# Patient Record
Sex: FEMALE | Race: AMERICAN INDIAN OR ALASKA NATIVE
[De-identification: names, ages, dates, MRNs, and addresses within clinical notes are randomized per-mention and may not be internally consistent; named-entity substitution may affect disease eponyms.]

---

## 2019-04-14 ENCOUNTER — HOSPITAL ENCOUNTER (EMERGENCY)
Dept: HOSPITAL 5 - ED | Age: 79
Discharge: HOME | End: 2019-04-14
Payer: MEDICARE

## 2019-04-14 VITALS — DIASTOLIC BLOOD PRESSURE: 48 MMHG | SYSTOLIC BLOOD PRESSURE: 111 MMHG

## 2019-04-14 DIAGNOSIS — K59.00: Primary | ICD-10-CM

## 2019-04-14 DIAGNOSIS — Z88.6: ICD-10-CM

## 2019-04-14 DIAGNOSIS — Z88.8: ICD-10-CM

## 2019-04-14 DIAGNOSIS — Z98.51: ICD-10-CM

## 2019-04-14 DIAGNOSIS — G89.29: ICD-10-CM

## 2019-04-14 DIAGNOSIS — E86.0: ICD-10-CM

## 2019-04-14 DIAGNOSIS — J44.9: ICD-10-CM

## 2019-04-14 DIAGNOSIS — E11.9: ICD-10-CM

## 2019-04-14 DIAGNOSIS — I10: ICD-10-CM

## 2019-04-14 DIAGNOSIS — N30.00: ICD-10-CM

## 2019-04-14 LAB
ALBUMIN SERPL-MCNC: 4.3 G/DL (ref 3.9–5)
ALT SERPL-CCNC: 18 UNITS/L (ref 7–56)
APTT BLD: 33.2 SEC. (ref 24.2–36.6)
BASOPHILS # (AUTO): 0.2 K/MM3 (ref 0–0.1)
BASOPHILS NFR BLD AUTO: 1.4 % (ref 0–1.8)
BILIRUB UR QL STRIP: (no result)
BLOOD UR QL VISUAL: (no result)
BUN SERPL-MCNC: 12 MG/DL (ref 7–17)
BUN/CREAT SERPL: 12 %
CALCIUM SERPL-MCNC: 9.4 MG/DL (ref 8.4–10.2)
EOSINOPHIL # BLD AUTO: 0.1 K/MM3 (ref 0–0.4)
EOSINOPHIL NFR BLD AUTO: 0.4 % (ref 0–4.3)
HCT VFR BLD CALC: 38.2 % (ref 30.3–42.9)
HEMOLYSIS INDEX: 27
HGB BLD-MCNC: 12.7 GM/DL (ref 10.1–14.3)
INR PPP: 1.03 (ref 0.87–1.13)
LYMPHOCYTES # BLD AUTO: 2.3 K/MM3 (ref 1.2–5.4)
LYMPHOCYTES NFR BLD AUTO: 15.1 % (ref 13.4–35)
MCHC RBC AUTO-ENTMCNC: 33 % (ref 30–34)
MCV RBC AUTO: 89 FL (ref 79–97)
MONOCYTES # (AUTO): 1.3 K/MM3 (ref 0–0.8)
MONOCYTES % (AUTO): 8.7 % (ref 0–7.3)
PH UR STRIP: 8 [PH] (ref 5–7)
PLATELET # BLD: 212 K/MM3 (ref 140–440)
PROT UR STRIP-MCNC: (no result) MG/DL
RBC # BLD AUTO: 4.3 M/MM3 (ref 3.65–5.03)
RBC #/AREA URNS HPF: 42 /HPF (ref 0–6)
UROBILINOGEN UR-MCNC: < 2 MG/DL (ref ?–2)
WBC #/AREA URNS HPF: 15 /HPF (ref 0–6)

## 2019-04-14 PROCEDURE — 96361 HYDRATE IV INFUSION ADD-ON: CPT

## 2019-04-14 PROCEDURE — 96375 TX/PRO/DX INJ NEW DRUG ADDON: CPT

## 2019-04-14 PROCEDURE — 93010 ELECTROCARDIOGRAM REPORT: CPT

## 2019-04-14 PROCEDURE — 80053 COMPREHEN METABOLIC PANEL: CPT

## 2019-04-14 PROCEDURE — 85610 PROTHROMBIN TIME: CPT

## 2019-04-14 PROCEDURE — 81001 URINALYSIS AUTO W/SCOPE: CPT

## 2019-04-14 PROCEDURE — 84484 ASSAY OF TROPONIN QUANT: CPT

## 2019-04-14 PROCEDURE — 36415 COLL VENOUS BLD VENIPUNCTURE: CPT

## 2019-04-14 PROCEDURE — 85025 COMPLETE CBC W/AUTO DIFF WBC: CPT

## 2019-04-14 PROCEDURE — 74176 CT ABD & PELVIS W/O CONTRAST: CPT

## 2019-04-14 PROCEDURE — 83690 ASSAY OF LIPASE: CPT

## 2019-04-14 PROCEDURE — 93005 ELECTROCARDIOGRAM TRACING: CPT

## 2019-04-14 PROCEDURE — 96365 THER/PROPH/DIAG IV INF INIT: CPT

## 2019-04-14 PROCEDURE — 96372 THER/PROPH/DIAG INJ SC/IM: CPT

## 2019-04-14 PROCEDURE — 85730 THROMBOPLASTIN TIME PARTIAL: CPT

## 2019-04-14 PROCEDURE — 99284 EMERGENCY DEPT VISIT MOD MDM: CPT

## 2019-04-14 PROCEDURE — 71046 X-RAY EXAM CHEST 2 VIEWS: CPT

## 2019-04-14 NOTE — XRAY REPORT
PROCEDURE:  XR CHEST ROUTINE 2V 

 

TECHNIQUE:  PA and lateral chest radiographs were obtained.  

 

HISTORY:  epigastric chest pain  

 

COMPARISONS:  None. 

 

FINDINGS: 

 

Heart:  Normal. 

Mediastinum/Vessels: Normal. 

Lungs/Pleural space:  Mild COPD. No consolidation, effusion or pneumothorax. 

Bony thorax: No acute osseous abnormality. 

 

 

IMPRESSION:  Mild COPD. There is no evidence of an acute infiltrate or effusion. 

 

This document is electronically signed by Lela Kraus DO., April 14 2019 03:16:10 AM ET

## 2019-04-14 NOTE — CAT SCAN REPORT
PROCEDURE: CT ABDOMEN PELVIS WO CON  

 

preliminary report: 

Lingular linear scar versus atelectasis 

The lung bases appear hyperinflated 

The gallbladder is distended but is otherwise unremarkable 

No focal pancreatic lesion is seen  

no renal or ureteral calculus 

No bowel obstruction 

Normal appendix 

No evidence of diverticulitis 

No adnexal mass 

The urinary bladder is unremarkable 

Considerable rectal wall thickening and perirectal stranding, consistent with proctitis. No perirecta
l abscess is seen 

 

This document is electronically signed by Fantasma Martinez MD., April 14 2019 04:33:56 AM ET

## 2019-04-14 NOTE — EMERGENCY DEPARTMENT REPORT
<EPHRAIM CAI - Last Filed: 19 06:57>





ED Abdominal Pain HPI





- General


Chief Complaint: Abdominal Pain


Stated Complaint: CONSTIPATION


Time Seen by Provider: 19 02:26


Source: patient


Mode of arrival: Ambulatory


Limitations: No Limitations





- History of Present Illness


Initial Comments: 


 Patient is 78-year-old  female with a history of IBS 

hypertension who presents for constipation last bowel movement 3 days ago pain 

radiates from her abdominal epigastric patient is on omeprazole otherwise that 

she is not adherent with same medications for diabetes and arthritis  patient 

followed by gastroenterology Dr. Makayla ANDERSON same in 2 days as Monday there is no 

nausea vomiting no fever chills there is decreased appetite patient does note 

decreased urinary output R there is no chest pain or shortness of breath.  

Patient states one large bowel movement prior to arriving to ED tonight





MD Complaint: abdominal pain


Onset/Timin


-: days(s)


Location: diffuse


Radiation: LLQ, RLQ


Migration to: epigastric


Severity: moderate


Severity scale (0 -10): 1


Quality: aching


Consistency: constant


Improves With: bowel movement


Worsens With: movement


Context: other (constipation )


Associated Symptoms: nausea, constipation





- Related Data


                                  Previous Rx's











 Medication  Instructions  Recorded  Last Taken  Type


 


Ciprofloxacin HCl [Cipro] 500 mg PO BID 10 Days #20 tablet 19 Unknown Rx


 


Polyethylene Glycol 3350 [Miralax 17 gm PO BID PRN #14 packet 19 Unknown 

Rx





3350]    











                                    Allergies











Allergy/AdvReac Type Severity Reaction Status Date / Time


 


ibuprofen [From Motrin] Allergy  Diarrhea Verified 19 02:34


 


iodine AdvReac  Anaphylaxis Verified 19 02:35














ED Review of Systems


Constitutional: denies: chills, fever


Eyes: denies: eye pain, eye discharge, vision change


ENT: denies: ear pain, throat pain


Respiratory: denies: cough, shortness of breath, wheezing


Cardiovascular: denies: chest pain, palpitations


Endocrine: no symptoms reported


Gastrointestinal: abdominal pain, nausea, constipation.  denies: vomiting, 

diarrhea, hematemesis, melena, hematochezia


Genitourinary: denies: urgency, dysuria, frequency, hematuria, discharge, 

abnormal menses, dyspareunia


Musculoskeletal: denies: back pain, joint swelling, arthralgia


Skin: denies: rash, lesions


Neurological: denies: headache, weakness, paresthesias


Psychiatric: denies: anxiety, depression


Hematological/Lymphatic: denies: easy bleeding, easy bruising





ED Past Medical Hx





- Past Medical History


Hx Hypertension: Yes


Hx Diabetes: Yes


Hx Asthma: Yes


Hx COPD: Yes


Additional medical history: Chronic Pain, IBS, Chronic Constipation





- Surgical History


Past Surgical History?: Yes


Additional Surgical History: Left rotator cuff. Tubal Ligation





- Social History


Smoking Status: Never Smoker


Substance Use Type: None





- Medications


Home Medications: 


                                Home Medications











 Medication  Instructions  Recorded  Confirmed  Last Taken  Type


 


Ciprofloxacin HCl [Cipro] 500 mg PO BID 10 Days #20 tablet 19  Unknown Rx


 


Polyethylene Glycol 3350 [Miralax 17 gm PO BID PRN #14 packet 19  Unknown 

Rx





3350]     














ED Physical Exam





- General


Limitations: No Limitations


General appearance: alert, in no apparent distress





- Head


Head exam: Present: atraumatic, normocephalic





- Eye


Eye exam: Present: normal appearance, PERRL, EOMI


Pupils: Present: normal accommodation





- ENT


ENT exam: Present: normal exam, mucous membranes moist





- Neck


Neck exam: Present: normal inspection, full ROM.  Absent: tenderness, 

meningismus, lymphadenopathy, thyromegaly





- Respiratory


Respiratory exam: Present: normal lung sounds bilaterally.  Absent: respiratory 

distress, wheezes, stridor, chest wall tenderness





- Cardiovascular


Cardiovascular Exam: Present: normal rhythm, tachycardia, normal heart sounds.  

Absent: systolic murmur, diastolic murmur, rubs, gallop





- GI/Abdominal


GI/Abdominal exam: Present: soft, distended (mild distention ), tenderness (LLQ 

LUQ ), normal bowel sounds.  Absent: guarding, rebound, rigid, bruit, hernia





- Extremities Exam


Extremities exam: Present: normal inspection





- Back Exam


Back exam: Present: normal inspection, full ROM.  Absent: tenderness, CVA tender

ness (R), CVA tenderness (L), muscle spasm, rash noted





- Neurological Exam


Neurological exam: Present: alert, oriented X3, CN II-XII intact, normal gait





- Psychiatric


Psychiatric exam: Present: normal affect





- Skin


Skin exam: Present: warm, dry, intact, normal color.  Absent: rash





ED Medical Decision Making





- Lab Data


Result diagrams: 


                                 19 03:14





                                 19 03:14








Labs











  19/19





  03:14 03:14 03:14


 


WBC  15.2 H  


 


RBC  4.30  


 


Hgb  12.7  


 


Hct  38.2  


 


MCV  89  


 


MCH  30  


 


MCHC  33  


 


RDW  13.4  


 


Plt Count  212  


 


Lymph % (Auto)  15.1  


 


Mono % (Auto)  8.7 H  


 


Eos % (Auto)  0.4  


 


Baso % (Auto)  1.4  


 


Lymph #  2.3  


 


Mono #  1.3 H  


 


Eos #  0.1  


 


Baso #  0.2 H  


 


Seg Neutrophils %  74.4 H  


 


Seg Neutrophils #  11.3 H  


 


PT    14.1


 


INR    1.03


 


APTT    33.2


 


Sodium   133 L 


 


Potassium   4.6 


 


Chloride   96.9 L 


 


Carbon Dioxide   26 


 


Anion Gap   15 


 


BUN   12 


 


Creatinine   1.0 


 


Estimated GFR   > 60 


 


BUN/Creatinine Ratio   12 


 


Glucose   150 H 


 


Calcium   9.4 


 


Total Bilirubin   0.50 


 


AST   23 


 


ALT   18 


 


Alkaline Phosphatase   102 


 


Troponin T   


 


Total Protein   7.5 


 


Albumin   4.3 


 


Albumin/Globulin Ratio   1.3 


 


Lipase   8 L 


 


Urine Color   


 


Urine Turbidity   


 


Urine pH   


 


Ur Specific Gravity   


 


Urine Protein   


 


Urine Glucose (UA)   


 


Urine Ketones   


 


Urine Blood   


 


Urine Nitrite   


 


Urine Bilirubin   


 


Urine Urobilinogen   


 


Ur Leukocyte Esterase   


 


Urine WBC (Auto)   


 


Urine RBC (Auto)   


 


U Epithel Cells (Auto)   














  19





  03:14 05:55


 


WBC  


 


RBC  


 


Hgb  


 


Hct  


 


MCV  


 


MCH  


 


MCHC  


 


RDW  


 


Plt Count  


 


Lymph % (Auto)  


 


Mono % (Auto)  


 


Eos % (Auto)  


 


Baso % (Auto)  


 


Lymph #  


 


Mono #  


 


Eos #  


 


Baso #  


 


Seg Neutrophils %  


 


Seg Neutrophils #  


 


PT  


 


INR  


 


APTT  


 


Sodium  


 


Potassium  


 


Chloride  


 


Carbon Dioxide  


 


Anion Gap  


 


BUN  


 


Creatinine  


 


Estimated GFR  


 


BUN/Creatinine Ratio  


 


Glucose  


 


Calcium  


 


Total Bilirubin  


 


AST  


 


ALT  


 


Alkaline Phosphatase  


 


Troponin T  < 0.010 


 


Total Protein  


 


Albumin  


 


Albumin/Globulin Ratio  


 


Lipase  


 


Urine Color   Yellow


 


Urine Turbidity   Clear


 


Urine pH   8.0 H


 


Ur Specific Gravity   1.008


 


Urine Protein   <15 mg/dl


 


Urine Glucose (UA)   Neg


 


Urine Ketones   Tr


 


Urine Blood   Mod


 


Urine Nitrite   Neg


 


Urine Bilirubin   Neg


 


Urine Urobilinogen   < 2.0


 


Ur Leukocyte Esterase   Mod


 


Urine WBC (Auto)   15.0 H


 


Urine RBC (Auto)   42.0


 


U Epithel Cells (Auto)   < 1.0














- EKG Data


EKG shows normal: sinus rhythm, axis, intervals, QRS complexes, ST-T waves


Rate: tachycardia





- EKG Data


Interpretation: no acute changes, normal EKG (ekg interp by ed attending )





- Radiology Data


Radiology results: report reviewed, image reviewed


[facility] 


[address] [place] [address] 





Cat Scan Report 


Signed 





Patient: VIRGINIE MIMS MR#: 


[id number] 


: 1940 Acct:[id number] 





Age/Sex: 78 / F ADM Date: 19 





Loc: ED 


Attending Dr: 








Ordering Physician: EPHRAIM CAI NP 


Date of Service: 19 


Procedure(s): CT abdomen pelvis wo con 


Accession Number(s): [id number] 





cc: EPHRAIM CAI NP 








PROCEDURE: CT ABDOMEN PELVIS WO CON 





preliminary report: 


Lingular linear scar versus atelectasis 


The lung bases appear hyperinflated 


The gallbladder is distended but is otherwise unremarkable 


No focal pancreatic lesion is seen 


no renal or ureteral calculus 


No bowel obstruction 


Normal appendix 


No evidence of diverticulitis 


No adnexal mass 


The urinary bladder is unremarkable 


Considerable rectal wall thickening and perirectal stranding, consistent with 

proctitis. No 


perirectal abscess is seen 





This document is electronically signed by Fantasma Martinez MD., 2019 

04:33:56 AM ET 





Transcribed By: LUAN 


Dictated By: FANTASMA MARTINEZ MD 


Electronically Authenticated By: FANTASMA MARTINEZ MD 


Signed Date/Time: 19 0811 

















- Medical Decision Making


ekg: NSR, no ST elevated MI,  cxr: mild copd no infiltrates no opacities, ct: no

obstruction, no bleed no diverticulitis, ua" mod luek, wbc, tx for uti, miralax 

as ordered follow up with GI as scheduled in 2 days pt and daughter verbalized 

agreement and understanding of same, pt for dc to home in stable condition at 

this time. pt is now a/o x 3 ambulatory with steady gait, abd pain is now 1/10 ,

pt is tolerating po intake and hydration without symptoms. Tacycardia is 

rosolved there is no cp no vomiting no sob. 








ED Disposition


Clinical Impression: 


 Dehydration





Constipation


Qualifiers:


 Constipation type: unspecified constipation type Qualified Code(s): K59.00 - 

Constipation, unspecified





UTI (urinary tract infection)


Qualifiers:


 Urinary tract infection type: acute cystitis Hematuria presence: without 

hematuria Qualified Code(s): N30.00 - Acute cystitis without hematuria





Disposition: DC-01 TO HOME OR SELFCARE


Is pt being admited?: No


Does the pt Need Aspirin: No


Condition: Stable


Instructions:  Constipation (ED), Urinary Tract Infection in Women (ED), High 

Fiber Diet (ED), Abdominal Pain (ED)


Additional Instructions: 


Follow up with Dr. [facility] in 2 days as scheduled 


Prescriptions: 


Ciprofloxacin HCl [Cipro] 500 mg PO BID 10 Days #20 tablet


Polyethylene Glycol 3350 [Miralax 3350] 17 gm PO BID PRN #14 packet


 PRN Reason: Constipation


Referrals: 


[facility] [Provider Group] - 3-5 Days


Forms:  Work/School Release Form(ED)





<JESSICAROBY - Last Filed: 04/15/19 11:56>





ED Review of Systems


ROS: 


Stated complaint: CONSTIPATION


Other details as noted in HPI








ED Course


                                   Vital Signs











  19





  00:07 00:22 07:28


 


Temperature 99.0 F 99 F 


 


Pulse Rate 124 H 119 H 85


 


Respiratory 18 18 





Rate   


 


Blood Pressure 127/62 127/62 111/48


 


O2 Sat by Pulse 97 97 94





Oximetry   














- Reevaluation(s)


Reevaluation #1: 





04/15/19 11:55


I am signing this chart for administrative purposes.  I was available for consul

tation while this patient was in the care of my nurse practitioner colleague.  

The specific management decisions and this case were not specifically discussed 

with myself.





ED Medical Decision Making





- Lab Data


Result diagrams: 


                                 19 03:14





                                 19 03:14


Critical care attestation.: 


If time is entered above; I have spent that time in minutes in the direct care 

of this critically ill patient, excluding procedure time.








ED Disposition


Is pt being admited?: No


Does the pt Need Aspirin: No

## 2019-07-22 ENCOUNTER — HOSPITAL ENCOUNTER (OUTPATIENT)
Dept: HOSPITAL 5 - XRAY | Age: 79
Discharge: HOME | End: 2019-07-22
Attending: PAIN MEDICINE
Payer: MEDICARE

## 2019-07-22 DIAGNOSIS — I10: ICD-10-CM

## 2019-07-22 DIAGNOSIS — M47.816: ICD-10-CM

## 2019-07-22 DIAGNOSIS — M16.12: Primary | ICD-10-CM

## 2019-07-22 DIAGNOSIS — J44.9: ICD-10-CM

## 2019-07-22 PROCEDURE — 72148 MRI LUMBAR SPINE W/O DYE: CPT

## 2019-07-22 NOTE — MAGNETIC RESONANCE REPORT
MR lumbar spine wo con



INDICATION / CLINICAL INFORMATION:

79 years Female; LOW BACK PAIN. 79-year-old female with low back and left leg pain





TECHNIQUE:

Multisequence, multiplanar images of the lumbar spine were obtained.



COMPARISON: 

None available.



FINDINGS:



ALIGNMENT: Normal lumbar lordosis without significant scoliosis.

VERTEBRAE:Normal marrow signal and vertebral body height for age.

VISUALIZED SPINAL CORD: No significant abnormality. Conus is grossly normal in appearance.



INTERVERTEBRAL DISCS: Multilevel desiccation noted.

LEVEL-BY-LEVEL ANALYSIS: 



L1-2: No significant abnormality.



L2-3: No significant abnormality. Mild facet hypertrophy.



L3-4: No significant abnormality. Mild facet hypertrophy.



L4-5: Mild disc bulge and facet hypertrophy.



L5-S1: Minimal disc bulge and mild facet hypertrophy.



PARASPINAL SOFT TISSUES: No significant abnormality.



ADDITIONAL FINDINGS: None.



IMPRESSION:

1. Mild degenerative changes of the lumbar spine as described above. No single, dominant cause for pa
loydant's symptomatology seen.



Signer Name: Sudhakar Agudelo MD, III 

Signed: 7/22/2019 5:30 PM

 Workstation Name: [id number]

## 2020-02-24 ENCOUNTER — HOSPITAL ENCOUNTER (OUTPATIENT)
Dept: HOSPITAL 5 - CT | Age: 80
Discharge: HOME | End: 2020-02-24
Attending: SPECIALIST
Payer: MEDICARE

## 2020-02-24 DIAGNOSIS — E04.1: Primary | ICD-10-CM

## 2020-02-24 DIAGNOSIS — R05: ICD-10-CM

## 2020-02-24 DIAGNOSIS — R06.02: ICD-10-CM

## 2020-02-24 PROCEDURE — 71250 CT THORAX DX C-: CPT

## 2020-02-24 NOTE — CAT SCAN REPORT
CT CHEST WITHOUT CONTRAST



INDICATION:

PERSISTENT COUGH,SHORTNESS OF BREATH.



TECHNIQUE:

Axial CT images were obtained through the chest without contrast. Lack of IV contrast limits evaluati
on of the vascular and solid organs. Coronal and sagittal reformats were produced. All CT scans at [facility] are performed using CT dose reduction for ALARA by means of automated exposure control. 



COMPARISON:

2 views of the chest from 4/14/2019.



FINDINGS:



MEDIASTINUM: The thyroid gland is heterogeneous with a 1.3 cm nodule seen along the mid pole of the r
ight lobe. No mass, lymphadenopathy or other significant abnormality is seen.

HEART: No significant abnormality.

THORACIC AORTA AND ARTERIES: No significant abnormality.

LUNGS: The lungs are clear without a pleural effusion or pneumothorax. 



ADDITIONAL FINDINGS: None.



UPPER ABDOMEN: No significant abnormality.



SKELETAL SYSTEM: No significant abnormality.



IMPRESSION:

1. Nonspecific heterogeneity of the thyroid gland with a 1.3 cm right thyroid nodule. For a patient o
f this age, no further imaging is indicated at this time unless clinically warranted.

2. No other significant abnormality of the chest.





Nonpalpable nodules detected on US or other anatomic imaging studies are termed incidentally discover
ed nodules or incidentalomas. Nonpalpable nodules have the same risk of malignancy as palpable nodule
s with the same size. Generally, only nodules >1 cm should be evaluated, since they have a greater po
tential to be clinically significant cancers. (CELENA, 2009).



Follow up for incidental thyroid nodules <1 cm is not recommended.



In patients <35 years with an incidental thyroid nodule detected on CT, MRI, or extrathyroidal ultras
ound, dedicated thyroid ultrasound is recommended if the nodule is 1 cm, has no suspicious imaging fe
atures, and if the patient has normal life expectancy.



In patients 35 years with an incidental thyroid nodule detected on CT, MRI, or extrathyroidal ultraso
und, dedicated thyroid ultrasound is recommended if the nodule is 1.5 cm, has no suspicious imaging f
eatures, and if the patient has normal life expectancy.



Signer Name: Guerrero Mcleod MD 

Signed: 2/24/2020 12:47 PM

 Workstation Name: [id number]

## 2020-03-02 ENCOUNTER — HOSPITAL ENCOUNTER (OUTPATIENT)
Dept: HOSPITAL 5 - MAMMO | Age: 80
Discharge: HOME | End: 2020-03-02
Attending: INTERNAL MEDICINE
Payer: MEDICARE

## 2020-03-02 DIAGNOSIS — Z12.31: Primary | ICD-10-CM

## 2020-03-02 PROCEDURE — 77067 SCR MAMMO BI INCL CAD: CPT

## 2020-03-03 NOTE — MAMMOGRAPHY REPORT
DIGITAL SCREENING MAMMOGRAM WITH CAD, 3/2/2020



INDICATION: Routine screening mammography. 



TECHNIQUE:  Digital bilateral  2D mammography was obtained in the craniocaudal and mediolateral obliq
ue projections. This examination was interpreted with the benefit of Computer-Aided Detection analysi
s.



COMPARISON: 12/18/2018



FINDINGS: 



Breast Density: The breasts are heterogeneously dense, which may obscure small masses.



There is no evidence of dominant mass, suspicious calcifications or architectural distortion in eithe
r breast.



IMPRESSION:



Follow up recommendation: Routine yearly



BI-RADS Category 1:  Negative.



A "normal" or negative report should not discourage follow up or biopsy of a clinically significant f
inding.



A written summary of these findings will be mailed to the patient. The patient will be entered into a
 mammography reporting system which will generate a reminder letter for the patient's next appointmen
t at the appropriate interval.



The American College of Radiology recommends yearly mammograms starting at age 40 and continuing as l
larisa as a woman is in good health.  Breast MRI is recommended for women with an approximate 20-25% or 
greater lifetime risk of breast cancer, including women with a strong family history of breast or ova
miguel cancer or who have been treated for Hodgkin's disease.



Signer Name: Jose Angel Alexis MD 

Signed: 3/3/2020 9:30 AM

Workstation Name: [id number]

## 2020-09-17 ENCOUNTER — HOSPITAL ENCOUNTER (OUTPATIENT)
Dept: HOSPITAL 5 - CT | Age: 80
Discharge: HOME | End: 2020-09-17
Attending: SPECIALIST
Payer: MEDICARE

## 2020-09-17 DIAGNOSIS — R06.02: Primary | ICD-10-CM

## 2020-09-17 DIAGNOSIS — R10.9: ICD-10-CM

## 2020-09-17 DIAGNOSIS — R07.9: ICD-10-CM

## 2020-09-17 PROCEDURE — 71250 CT THORAX DX C-: CPT

## 2020-09-17 PROCEDURE — 74176 CT ABD & PELVIS W/O CONTRAST: CPT

## 2020-09-17 NOTE — CAT SCAN REPORT
CT CHEST, ABDOMEN, AND PELVIS WITHOUT CONTRAST



INDICATION:

CHEST PAIN,SOB, ABDOMINAL PAIN.



TECHNIQUE:

Axial CT images were obtained through the chest, abdomen, and pelvis without contrast. All CT scans a
t this location are performed using CT dose reduction for ALARA by means of automated exposure contro
l. 



COMPARISON:

CT chest without contrast from 2/24/2020.



FINDINGS:

HEART: No significant abnormality. 

THORACIC AORTA: No significant abnormality.

MEDIASTINUM and NOELLE: Stable right thyroid nodule. No other significant abnormality.

LUNGS: Mild bibasilar scarring/atelectasis. The lungs are otherwise clear.

PLEURA: No significant pleural effusion. No pneumothorax.

ADDITIONAL CHEST FINDINGS: None.



LIVER: No significant abnormality.

GALLBLADDER: No significant abnormality.  

BILE DUCTS: No significant abnormality.

PANCREAS: No significant abnormality.

SPLEEN: No significant abnormality.

ADRENALS: No significant abnormality.

RIGHT KIDNEY and URETER: No significant abnormality.

LEFT KIDNEY and URETER: No significant abnormality.



STOMACH and SMALL BOWEL: No significant abnormality. 

COLON: Mild scattered diverticulosis along the descending and sigmoid colon without evidence of diver
ticulitis. No other significant abnormality. 

APPENDIX: No significant abnormality.  

PERITONEUM: No free fluid. No free air. No fluid collection.

LYMPH NODES: No significant adenopathy.

AORTA and ARTERIES: Mild aortoiliac atherosclerosis. No other significant abnormality. 

IVC and VEINS: No significant abnormality.



URINARY BLADDER: No significant abnormality.

REPRODUCTIVE ORGANS: No significant abnormality.



ADDITIONAL FINDINGS: None.



SKELETAL SYSTEM: No significant abnormality.



IMPRESSION:

1. No acute abnormality of the chest, abdomen or pelvis.

2. Additional findings as above.



Signer Name: Guerrero Mcleod MD 

Signed: 9/17/2020 3:23 PM

Workstation Name: [email or web address]

## 2020-09-17 NOTE — CAT SCAN REPORT
CT CHEST, ABDOMEN, AND PELVIS WITHOUT CONTRAST



INDICATION:

CHEST PAIN,SOB, ABDOMINAL PAIN.



TECHNIQUE:

Axial CT images were obtained through the chest, abdomen, and pelvis without contrast. All CT scans a
t this location are performed using CT dose reduction for ALARA by means of automated exposure contro
l. 



COMPARISON:

CT chest without contrast from 2/24/2020.



FINDINGS:

HEART: No significant abnormality. 

THORACIC AORTA: No significant abnormality.

MEDIASTINUM and NOELLE: Stable right thyroid nodule. No other significant abnormality.

LUNGS: Mild bibasilar scarring/atelectasis. The lungs are otherwise clear.

PLEURA: No significant pleural effusion. No pneumothorax.

ADDITIONAL CHEST FINDINGS: None.



LIVER: No significant abnormality.

GALLBLADDER: No significant abnormality.  

BILE DUCTS: No significant abnormality.

PANCREAS: No significant abnormality.

SPLEEN: No significant abnormality.

ADRENALS: No significant abnormality.

RIGHT KIDNEY and URETER: No significant abnormality.

LEFT KIDNEY and URETER: No significant abnormality.



STOMACH and SMALL BOWEL: No significant abnormality. 

COLON: Mild scattered diverticulosis along the descending and sigmoid colon without evidence of diver
ticulitis. No other significant abnormality. 

APPENDIX: No significant abnormality.  

PERITONEUM: No free fluid. No free air. No fluid collection.

LYMPH NODES: No significant adenopathy.

AORTA and ARTERIES: Mild aortoiliac atherosclerosis. No other significant abnormality. 

IVC and VEINS: No significant abnormality.



URINARY BLADDER: No significant abnormality.

REPRODUCTIVE ORGANS: No significant abnormality.



ADDITIONAL FINDINGS: None.



SKELETAL SYSTEM: No significant abnormality.



IMPRESSION:

1. No acute abnormality of the chest, abdomen or pelvis.

2. Additional findings as above.



Signer Name: Guerrero Mcleod MD 

Signed: 9/17/2020 3:23 PM

Workstation Name: [id number]

## 2022-03-28 ENCOUNTER — HOSPITAL ENCOUNTER (OUTPATIENT)
Dept: HOSPITAL 5 - MAMMO | Age: 82
Discharge: HOME | End: 2022-03-28
Attending: INTERNAL MEDICINE
Payer: MEDICARE

## 2022-03-28 DIAGNOSIS — Z12.31: Primary | ICD-10-CM

## 2022-03-28 PROCEDURE — 77067 SCR MAMMO BI INCL CAD: CPT

## 2022-03-28 NOTE — MAMMOGRAPHY REPORT
DIGITAL SCREENING MAMMOGRAM WITH CAD, 3/28/2022



CLINICAL INFORMATION / INDICATION: Routine screening



TECHNIQUE:  Digital bilateral 2D mammography was obtained in the craniocaudal and mediolateral obliqu
e projections. This examination was interpreted with the benefit of Computer-Aided Detection analysis
.



COMPARISON: 3/2/2020



FINDINGS: 



Breast Density: There are scattered areas of fibroglandular density.



No dominant mass, suspicious calcifications, or architectural distortion in either breast. 





 

IMPRESSION: No mammographic evidence of malignancy.



Follow up recommendation: Routine yearly



BI-RADS Category 1:  NEGATIVE





-------------------------------------------------------------------------------------------

A "normal" or negative report should not discourage follow up or biopsy of a clinically significant f
inding.



A written summary of these findings will be mailed to the patient. The patient will be entered into a
 mammography reporting system which will generate a reminder letter for the patient's next appointmen
t at the appropriate interval.



The American College of Radiology recommends yearly mammograms starting at age 40 and continuing as l
larisa as a woman is in good health.  Breast MRI is recommended for women with an approximate 20-25% or 
greater lifetime risk of breast cancer, including women with a strong family history of breast or ova
miguel cancer or who have been treated for Hodgkin's disease.



Signer Name: Darren Patel MD 

Signed: 3/28/2022 4:01 PM

Workstation Name: [id number]

## 2022-07-21 ENCOUNTER — HOSPITAL ENCOUNTER (OUTPATIENT)
Dept: HOSPITAL 5 - XRAY | Age: 82
Discharge: HOME | End: 2022-07-21
Attending: PAIN MEDICINE
Payer: MEDICARE

## 2022-07-21 DIAGNOSIS — M19.012: Primary | ICD-10-CM

## 2022-07-21 DIAGNOSIS — M47.812: ICD-10-CM

## 2022-07-21 PROCEDURE — 72050 X-RAY EXAM NECK SPINE 4/5VWS: CPT

## 2022-07-21 NOTE — XRAY REPORT
XR spine cervical 4-5V



HISTORY: M54.2



COMPARISON: None.



TECHNIQUE: 5 view(s) of the cervical spine obtained.



FINDINGS:

Vertebrae: Normal alignment.   Vertebral body heights are preserved. C1 and C2 are congruent. Odontoi
d process is intact.

Spondylosis:Mild spondylotic changes most also C4-C5 and C5-C6.

Soft tissues: No prevertebral soft tissue thickening. 



IMPRESSION:

1.  Mild spondylosis. No acute findings.



Signer Name: Sohan Barajas MD 

Signed: 7/21/2022 2:11 PM

Workstation Name: [employer]

## 2022-07-21 NOTE — XRAY REPORT
XR shoulder 2+V LT



INDICATION:

M25.512 PAIN IN LEFT SHOULDER



COMPARISON:

None.



FINDINGS/IMPRESSION:



Advanced degenerative changes of left shoulder joint. No fracture. Normal alignment.

 



Signer Name: Sohan Barajas MD 

Signed: 7/21/2022 2:12 PM

Workstation Name: [email or web address] [id number]